# Patient Record
Sex: FEMALE | URBAN - METROPOLITAN AREA
[De-identification: names, ages, dates, MRNs, and addresses within clinical notes are randomized per-mention and may not be internally consistent; named-entity substitution may affect disease eponyms.]

---

## 2017-01-31 NOTE — PATIENT DISCUSSION
Fuchs' Endothelial Dystrophy Counseling: Fuch's is a progressive disease that affects the endothelial cells of the cornea. The patient was counseled on  early morning blurring of vision which will improve as the day progresses. The patient was advised of the possibility of decreased vision over time secondary to corneal edema. The possible need for a future corneal transplant was explained.

## 2017-01-31 NOTE — PATIENT DISCUSSION
CATARACTS, OU - NOT VISUALLY SIGNIFICANT. DISC OPTION OF [id number]. GLASSES RX GIVEN TO FILL IF DESIRES.

## 2017-01-31 NOTE — PATIENT DISCUSSION
EXPLAINED TO THE PATIENT THE POSSIBLE NEED FOR A DSAEK IN FUTURE AND THAT GUTTATA CAN SPLIT AND SCATTER LIGHT CAUSING GLARE. PT UNDERSTANDS.

## 2017-01-31 NOTE — PATIENT DISCUSSION
GLAUCOMA SUSPECT, OU &ndash; OPTIC NERVE CUPPING/ASYMMETRY. IOP WNL , OU. GONIO TODAY TO DOC ANGLES. OCT DONE TODAY TO DOCUMENT. VISUAL FIELD NEXT VISIT TO CHECK FOR VISUAL FIELD LOSS.

## 2017-01-31 NOTE — PATIENT DISCUSSION
Glaucoma Suspect Counseling: A glaucoma suspect is a person with one or more risk factors that may lead to glaucoma. I have explained to the patient that glaucoma potentially causes loss of peripheral vision due to damage to the optic nerve that is irreversible. I have explained to the patient that successful management is dependent on patient compliance with treatment as prescribed and/or regular follow-up to monitor for possible progression.

## 2017-05-10 ENCOUNTER — IMPORTED ENCOUNTER (OUTPATIENT)
Dept: URBAN - METROPOLITAN AREA CLINIC 1 | Facility: CLINIC | Age: 27
End: 2017-05-10

## 2017-05-10 PROCEDURE — 92083 EXTENDED VISUAL FIELD XM: CPT

## 2017-05-10 PROCEDURE — 92004 COMPRE OPH EXAM NEW PT 1/>: CPT

## 2020-07-28 NOTE — PATIENT DISCUSSION
Biopsy Lesion Eyelid: The patient had a lesion on the left upper eyelid. After informed consent the patient received a local anesthetic injection of 1% lidocaine with epinephrine 1:100,000 units. A section of the mass was excised with Tigist scissors and sent to the pathology laboratory for evaluation. The patient was given written post operative care instructions and a prescription for antibiotic ointment. The patient was asked to call our office within 10 days for follow up if the patient had not heard from our office regarding the result of the biopsy before that time.

## 2020-07-28 NOTE — PATIENT DISCUSSION
PHOTOGRAPHS: I have reviewed the external ocular photographs of this patient which show the following: lesion involving the left upper eyelid.

## 2020-07-28 NOTE — PATIENT DISCUSSION
LESION EXCISION AND DRAINAGE:  I have explained the indications, alternatives, risks and potential benefits of the procedure to the patient who fully understands and has given informed consent. The patient was taken to the treatment room where a time-out was performed prior to the procedure. The patient was placed on the operating table in the supine position. The skin around the affected lid was prepped and draped in the usual sterile fashion. The lid was infiltrated with 2% lidocaine with epinephrine. A lid speculum was placed on the lid. The lid was everted. A #11 blade was used to make a vertically oriented incision through the tarsal conjunctiva into the chalazion. The chalazion material was removed with a curette. The cut margin was cauterized with a cautery to stop the bleeding. The lid speculum was removed. The lid returned to normal orientation. The eye was treated with antibiotic ophthalmic ointment. The patient tolerated the procedure well and left the treatment room in stable condition.

## 2020-07-28 NOTE — PATIENT DISCUSSION
After informed consent the patient received 0.2 cc of Kenalog 10mg/ml to the left upper eyelid for management of abscess.

## 2020-11-04 NOTE — PATIENT DISCUSSION
Patient Education: Talked to pt's mom Sofia told her to call around for testing sites. She is going to do so. I told her to call back if she needs more of our help  Gila Rueda, CMA

## 2022-04-02 ASSESSMENT — VISUAL ACUITY
OD_CC: 20/20
OS_SC: J1
OD_CC: J1
OS_CC: 20/20
OD_SC: J1
OS_CC: J1
